# Patient Record
Sex: MALE | Race: WHITE | URBAN - METROPOLITAN AREA
[De-identification: names, ages, dates, MRNs, and addresses within clinical notes are randomized per-mention and may not be internally consistent; named-entity substitution may affect disease eponyms.]

---

## 2018-04-18 ENCOUNTER — TELEPHONE (OUTPATIENT)
Dept: OTHER | Facility: CLINIC | Age: 48
End: 2018-04-18

## 2018-04-18 NOTE — TELEPHONE ENCOUNTER
4/18/2018    Call Regarding Onboarding [employer] other     Attempt 1    Message on voicemail     Comments:       Outreach Schedulers  sb

## 2018-05-24 NOTE — TELEPHONE ENCOUNTER
5/24/2018    Call Regarding Onboarding [employer] OTHER    Attempt 2    Message on voicemail     Comments: spouse, 2 child dep      Outreach   [email or web address]

## 2018-06-08 NOTE — TELEPHONE ENCOUNTER
6/8/2018    Call Regarding Onboarding [employer] OTHER     Attempt 2    Message on voicemail     Comments:       Outreach   SB

## 2021-05-27 ENCOUNTER — RECORDS - HEALTHEAST (OUTPATIENT)
Dept: ADMINISTRATIVE | Facility: CLINIC | Age: 51
End: 2021-05-27

## 2021-05-31 ENCOUNTER — RECORDS - HEALTHEAST (OUTPATIENT)
Dept: ADMINISTRATIVE | Facility: CLINIC | Age: 51
End: 2021-05-31